# Patient Record
Sex: MALE | Race: WHITE | URBAN - METROPOLITAN AREA
[De-identification: names, ages, dates, MRNs, and addresses within clinical notes are randomized per-mention and may not be internally consistent; named-entity substitution may affect disease eponyms.]

---

## 2024-10-31 ENCOUNTER — OFFICE VISIT (OUTPATIENT)
Dept: OTOLARYNGOLOGY | Facility: CLINIC | Age: 44
End: 2024-10-31
Payer: MEDICAID

## 2024-10-31 VITALS — BODY MASS INDEX: 31.29 KG/M2 | WEIGHT: 265 LBS | HEIGHT: 77 IN

## 2024-10-31 DIAGNOSIS — H60.502 ACUTE OTITIS EXTERNA OF LEFT EAR, UNSPECIFIED TYPE: ICD-10-CM

## 2024-10-31 DIAGNOSIS — H61.22 IMPACTED CERUMEN, LEFT EAR: Primary | ICD-10-CM

## 2024-10-31 PROCEDURE — 99203 OFFICE O/P NEW LOW 30 MIN: CPT | Performed by: STUDENT IN AN ORGANIZED HEALTH CARE EDUCATION/TRAINING PROGRAM

## 2024-10-31 PROCEDURE — 69210 REMOVE IMPACTED EAR WAX UNI: CPT | Performed by: STUDENT IN AN ORGANIZED HEALTH CARE EDUCATION/TRAINING PROGRAM

## 2024-10-31 RX ORDER — LEVOFLOXACIN 500 MG/1
TABLET, FILM COATED ORAL
COMMUNITY
Start: 2024-10-19

## 2024-10-31 RX ORDER — ACETIC ACID 20.65 MG/ML
5 SOLUTION AURICULAR (OTIC) 3 TIMES DAILY
Qty: 1 EACH | Refills: 1 | Status: SHIPPED | OUTPATIENT
Start: 2024-10-31

## 2024-10-31 RX ORDER — CIPROFLOXACIN AND DEXAMETHASONE 3; 1 MG/ML; MG/ML
SUSPENSION/ DROPS AURICULAR (OTIC)
COMMUNITY
Start: 2024-10-19

## 2024-10-31 RX ORDER — FLUCONAZOLE 100 MG/1
TABLET ORAL
COMMUNITY
Start: 2024-10-19

## 2024-10-31 NOTE — PROGRESS NOTES
Austin Pedersen is a 43 year old male.   Chief Complaint   Patient presents with    New Patient    Ear Problem     Left ear infection with drainage     HPI:   43-year-old presents with left ear pain and drainage.  Has not responded to Ciprodex drop    Current Outpatient Medications   Medication Sig Dispense Refill    acetic acid 2 % Otic Solution Place 5 drops into both ears 3 (three) times daily. 1 each 1    ciprofloxacin-dexamethasone 0.3-0.1 % Otic Suspension  (Patient not taking: Reported on 10/31/2024)      fluconazole 100 MG Oral Tab  (Patient not taking: Reported on 10/31/2024)      levoFLOXacin 500 MG Oral Tab  (Patient not taking: Reported on 10/31/2024)        History reviewed. No pertinent past medical history.   Social History:  Social History     Socioeconomic History    Marital status: Single   Tobacco Use    Smoking status: Never    Smokeless tobacco: Never   Vaping Use    Vaping status: Never Used   Substance and Sexual Activity    Alcohol use: Not Currently    Drug use: Never      History reviewed. No pertinent surgical history.      EXAM:   Ht 6' 5\" (1.956 m)   Wt 265 lb (120.2 kg)   BMI 31.42 kg/m²     System Details   Skin Inspection - Normal.   Constitutional Overall appearance - Normal.   Head/Face Symmetric, TMJ tenderness not present    Eyes EOMI, PERRL   Right ear:  Canal clear, TM intact, no VANDANA   Left ear:  Canal with cerumen impaction occluding the view of the tympanic membrane and fungal debris, TM inflamed    Nose: Septum midline, inferior turbinates not enlarged, nasal valves without collapse    Oral cavity/Oropharynx: No lesions or masses on inspection or palpation, tonsils symmetric    Neck: Soft without LAD, thyroid not enlarged  Voice clear/ no stridor   Other:      SCOPES AND PROCEDURES:     Canals:  Left: Canal with cerumen preventing adequate view of TM as well as fungal debris, debrided with instrumentation    Tympanic Membranes:  Left: Normal tympanic membrane.     TM  Visualized Method:   Left TM examined via otomicroscopy.      PROCEDURE:   Removal of cerumen impaction   The cerumen impaction was completely removed on the left side using microscopy as necessary.   Removal was completed by using a curette and suction.     AUDIOGRAM AND IMAGING:         IMPRESSION:   1. Impacted cerumen, left ear    2. Acute otitis externa of left ear, unspecified type       Recommendations:  -Has a left-sided fungal otitis externa.  The ear was debrided today of cerumen and fungal elements  -Will begin on acetic acid otic drops 3 times a day  -Dry ear precautions discussed will see him back in 1    The patient indicates understanding of these issues and agrees to the plan.      Patrick Dior MD  10/31/2024  3:03 PM

## 2024-11-07 ENCOUNTER — OFFICE VISIT (OUTPATIENT)
Dept: OTOLARYNGOLOGY | Facility: CLINIC | Age: 44
End: 2024-11-07
Payer: MEDICAID

## 2024-11-07 DIAGNOSIS — H61.22 IMPACTED CERUMEN, LEFT EAR: ICD-10-CM

## 2024-11-07 DIAGNOSIS — H60.502 ACUTE OTITIS EXTERNA OF LEFT EAR, UNSPECIFIED TYPE: Primary | ICD-10-CM

## 2024-11-07 PROCEDURE — 69210 REMOVE IMPACTED EAR WAX UNI: CPT | Performed by: STUDENT IN AN ORGANIZED HEALTH CARE EDUCATION/TRAINING PROGRAM

## 2024-11-07 PROCEDURE — 99213 OFFICE O/P EST LOW 20 MIN: CPT | Performed by: STUDENT IN AN ORGANIZED HEALTH CARE EDUCATION/TRAINING PROGRAM

## 2024-11-07 NOTE — PROGRESS NOTES
Austin Pedersen is a 43 year old male.   Chief Complaint   Patient presents with    Follow - Up     Patient is here for follow up left ear reports ear infection reports improvement reports hearing loss on the left ear.     HPI:   43-year-old follow-up regarding his left-sided fungal otitis externa.  Overall feeling better although still having some muffled hearing    Current Outpatient Medications   Medication Sig Dispense Refill    acetic acid 2 % Otic Solution Place 5 drops into both ears 3 (three) times daily. 1 each 1      History reviewed. No pertinent past medical history.   Social History:  Social History     Socioeconomic History    Marital status: Single   Tobacco Use    Smoking status: Never    Smokeless tobacco: Never   Vaping Use    Vaping status: Never Used   Substance and Sexual Activity    Alcohol use: Not Currently    Drug use: Never      History reviewed. No pertinent surgical history.      EXAM:   There were no vitals taken for this visit.    System Details   Skin Inspection - Normal.   Constitutional Overall appearance - Normal.   Head/Face Symmetric, TMJ tenderness not present    Eyes EOMI, PERRL   Right ear:  Canal clear, TM intact, no VANDANA   Left ear:  Canal with wet cerumen occluding the view of the tympanic membrane, TM slightly wet and inflamed, no VANDANA   Nose: Septum midline, inferior turbinates not enlarged, nasal valves without collapse    Oral cavity/Oropharynx: No lesions or masses on inspection or palpation, tonsils symmetric    Neck: Soft without LAD, thyroid not enlarged  Voice clear/ no stridor   Other:      SCOPES AND PROCEDURES:     Canals:  Left: Canal with cerumen preventing adequate view of TM, debrided with instrumentation    Tympanic Membranes:  Left: Normal tympanic membrane.     TM Visualized Method:   Left TM examined via otomicroscopy.      PROCEDURE:   Removal of cerumen impaction   The cerumen impaction was completely removed on the left side using microscopy as  necessary.   Removal was completed by using a curette and suction.     AUDIOGRAM AND IMAGING:         IMPRESSION:   1. Acute otitis externa of left ear, unspecified type    2. Impacted cerumen, left ear       Recommendations:  -Left ear debrided again today.  Overall his ear appears to be healing well.  No signs of a fungal infection  -Hearing concerns likely because of the inflammation of the tympanic membrane  -He will stop the otic drops and let it dry out and abide by dry ear precautions should improve over the next several weeks or else he should return    The patient indicates understanding of these issues and agrees to the plan.      Patrick Dior MD  11/7/2024  1:26 PM